# Patient Record
Sex: FEMALE | Race: BLACK OR AFRICAN AMERICAN | NOT HISPANIC OR LATINO | URBAN - METROPOLITAN AREA
[De-identification: names, ages, dates, MRNs, and addresses within clinical notes are randomized per-mention and may not be internally consistent; named-entity substitution may affect disease eponyms.]

---

## 2021-05-17 ENCOUNTER — WEB ENCOUNTER (OUTPATIENT)
Dept: URBAN - METROPOLITAN AREA CLINIC 113 | Facility: CLINIC | Age: 41
End: 2021-05-17

## 2021-05-17 ENCOUNTER — OFFICE VISIT (OUTPATIENT)
Dept: URBAN - METROPOLITAN AREA CLINIC 113 | Facility: CLINIC | Age: 41
End: 2021-05-17
Payer: COMMERCIAL

## 2021-05-17 ENCOUNTER — LAB OUTSIDE AN ENCOUNTER (OUTPATIENT)
Dept: URBAN - METROPOLITAN AREA CLINIC 113 | Facility: CLINIC | Age: 41
End: 2021-05-17

## 2021-05-17 VITALS
WEIGHT: 262 LBS | BODY MASS INDEX: 46.42 KG/M2 | RESPIRATION RATE: 18 BRPM | DIASTOLIC BLOOD PRESSURE: 93 MMHG | HEIGHT: 63 IN | TEMPERATURE: 97.5 F | SYSTOLIC BLOOD PRESSURE: 148 MMHG | HEART RATE: 86 BPM

## 2021-05-17 DIAGNOSIS — R10.84 GENERALIZED ABDOMINAL PAIN: ICD-10-CM

## 2021-05-17 DIAGNOSIS — K62.5 BRIGHT RED BLOOD PER RECTUM: ICD-10-CM

## 2021-05-17 DIAGNOSIS — R19.7 INTERMITTENT DIARRHEA: ICD-10-CM

## 2021-05-17 PROCEDURE — 99204 OFFICE O/P NEW MOD 45 MIN: CPT | Performed by: INTERNAL MEDICINE

## 2021-05-17 RX ORDER — MEDROXYPROGESTERONE ACETATE 150 MG/ML
1 ML INJECTION, SUSPENSION INTRAMUSCULAR
Status: ACTIVE | COMMUNITY

## 2021-05-17 RX ORDER — SODIUM, POTASSIUM,MAG SULFATES 17.5-3.13G
354ML SOLUTION, RECONSTITUTED, ORAL ORAL
Qty: 354 MILLILITER | Refills: 0 | OUTPATIENT
Start: 2021-05-17 | End: 2021-05-18

## 2021-05-17 RX ORDER — DICYCLOMINE HYDROCHLORIDE 10 MG/1
1 TABLET CAPSULE ORAL
Qty: 60 | Refills: 3 | OUTPATIENT
Start: 2021-05-17 | End: 2021-09-14

## 2021-05-17 NOTE — HPI-TODAY'S VISIT:
This is a 41-year-old female with a history of obesity presenting for evaluation of episodic abdominal pain and diarrhea. She reports onset of symptoms 2 years ago.  She has episodes during which she typically awakens at night with severe pain throughout her abdomen.  Eventually, she has diarrhea reporting multiple watery stools associated with feeling overheated and diaphoretic.  Occasionally she has associated nausea.  She reports associated acid reflux.  These episodes occur as often as every other week.  In between episodes, she has 2 or 3 soft bowel movements per day and infrequent diet dependent heartburn.  During an episode in December, she noticed red blood per rectum on the tissue.  She went to the emergency department at [facility].  She reports labs, urinalysis, and a CT scan were performed.  She was diagnosed with a urinary tract infection and gastritis.  She was prescribed medication that may have been Carafate and an antibiotic for the infection. She has identified some symptom exacerbations following ingestion of dairy products.  However, this seems to be inconsistent and may be dependent upon the volume of dairy which she has consumed.  She denies a history of hypertension and has regular visits with her gynecologist but does not have a primary care physician. She prefers to continue her care with Dr. Bliss in [place].

## 2021-06-18 ENCOUNTER — OFFICE VISIT (OUTPATIENT)
Dept: URBAN - METROPOLITAN AREA MEDICAL CENTER 40 | Facility: MEDICAL CENTER | Age: 41
End: 2021-06-18
Payer: COMMERCIAL

## 2021-06-18 DIAGNOSIS — K57.30 DIVERTICULA OF COLON: ICD-10-CM

## 2021-06-18 DIAGNOSIS — K62.1 RECTAL POLYP: ICD-10-CM

## 2021-06-18 DIAGNOSIS — K62.5 RECTAL BLEED: ICD-10-CM

## 2021-06-18 DIAGNOSIS — R19.7 BILE ACID DIARRHEA: ICD-10-CM

## 2021-06-18 PROCEDURE — 45380 COLONOSCOPY AND BIOPSY: CPT | Performed by: INTERNAL MEDICINE

## 2021-07-09 ENCOUNTER — OFFICE VISIT (OUTPATIENT)
Dept: URBAN - METROPOLITAN AREA CLINIC 72 | Facility: CLINIC | Age: 41
End: 2021-07-09

## 2021-07-15 ENCOUNTER — DASHBOARD ENCOUNTERS (OUTPATIENT)
Age: 41
End: 2021-07-15

## 2021-07-15 ENCOUNTER — OFFICE VISIT (OUTPATIENT)
Dept: URBAN - METROPOLITAN AREA CLINIC 72 | Facility: CLINIC | Age: 41
End: 2021-07-15
Payer: COMMERCIAL

## 2021-07-15 VITALS
HEIGHT: 63 IN | BODY MASS INDEX: 45.89 KG/M2 | SYSTOLIC BLOOD PRESSURE: 156 MMHG | WEIGHT: 259 LBS | DIASTOLIC BLOOD PRESSURE: 107 MMHG | RESPIRATION RATE: 20 BRPM | TEMPERATURE: 98.4 F | HEART RATE: 98 BPM

## 2021-07-15 DIAGNOSIS — R19.7 INTERMITTENT DIARRHEA: ICD-10-CM

## 2021-07-15 DIAGNOSIS — R10.84 GENERALIZED ABDOMINAL PAIN: ICD-10-CM

## 2021-07-15 DIAGNOSIS — K57.30 COLON, DIVERTICULOSIS: ICD-10-CM

## 2021-07-15 DIAGNOSIS — K62.5 BRIGHT RED BLOOD PER RECTUM: ICD-10-CM

## 2021-07-15 PROBLEM — 102614006: Status: ACTIVE | Noted: 2021-05-17

## 2021-07-15 PROBLEM — 62315008: Status: ACTIVE | Noted: 2021-05-17

## 2021-07-15 PROBLEM — 733657002: Status: ACTIVE | Noted: 2021-05-23

## 2021-07-15 PROBLEM — 74474003: Status: ACTIVE | Noted: 2021-05-17

## 2021-07-15 PROCEDURE — 99213 OFFICE O/P EST LOW 20 MIN: CPT | Performed by: INTERNAL MEDICINE

## 2021-07-15 RX ORDER — MEDROXYPROGESTERONE ACETATE 150 MG/ML
1 ML INJECTION, SUSPENSION INTRAMUSCULAR
Status: ACTIVE | COMMUNITY

## 2021-07-15 RX ORDER — DICYCLOMINE HYDROCHLORIDE 10 MG/1
1 TABLET CAPSULE ORAL
Qty: 60 | Refills: 3 | Status: ACTIVE | COMMUNITY
Start: 2021-05-17 | End: 2021-09-14

## 2021-07-15 NOTE — HPI-TODAY'S VISIT:
Mrs. Liao returns for follow-up.  She is a pleasant 41-year-old female who was last seen in our office on 5/17/2021 for evaluation of bright red blood per rectum.  She had episodic abdominal pain and diarrhea in the distal bright blood per rectum.  CT scan done in the ER for this that showed gastritis and a UTI.  She noted some foods seem to trigger her symptoms specifically dairy products however with inconsistent.  It was recommended that she start dicyclomine and undergo a colonoscopy.  Colonoscopies completed without difficulty on 6/18/2021 completed through the terminal ileum with excellent bowel preparation.  Diverticulosis was noted throughout the descending colon and a single rectal polyp with found and removed.  Random biopsies showed no significant pathologic change and rectal polyps then be hyperplastic.   she has recovered from the procedure without difficulty and she is working on trying to increase her dietary fiber.  Her concern today is her abdominal discomfort.  She is taking dicyclomine with minimal improvement.  She notes that she has abdominal discomfort after meals, specifically as having issues with dairy, cut that out but did not see much improvement.  She notes that she hascut out Fried foods and still raising the discomfort is associated with nausea but no vomiting.  She ate something cooked in olive oil the other day and was in excruciating pain.  She does still have her gallbladder intact.

## 2021-08-05 ENCOUNTER — OFFICE VISIT (OUTPATIENT)
Dept: URBAN - METROPOLITAN AREA CLINIC 72 | Facility: CLINIC | Age: 41
End: 2021-08-05

## 2021-08-05 RX ORDER — DICYCLOMINE HYDROCHLORIDE 10 MG/1
1 TABLET CAPSULE ORAL
Qty: 60 | Refills: 3 | Status: ACTIVE | COMMUNITY
Start: 2021-05-17 | End: 2021-09-14

## 2021-08-05 RX ORDER — MEDROXYPROGESTERONE ACETATE 150 MG/ML
1 ML INJECTION, SUSPENSION INTRAMUSCULAR
Status: ACTIVE | COMMUNITY